# Patient Record
Sex: FEMALE | Race: WHITE | Employment: OTHER | URBAN - METROPOLITAN AREA
[De-identification: names, ages, dates, MRNs, and addresses within clinical notes are randomized per-mention and may not be internally consistent; named-entity substitution may affect disease eponyms.]

---

## 2021-10-25 ENCOUNTER — HOSPITAL ENCOUNTER (OUTPATIENT)
Dept: LAB | Age: 71
Discharge: HOME | End: 2021-10-25
Payer: MEDICARE

## 2021-10-25 DIAGNOSIS — I42.0 DILATED CARDIOMYOPATHY (HCC): ICD-10-CM

## 2021-10-25 LAB
ANION GAP SERPL CALC-SCNC: 3 MMOL/L (ref 7–16)
BASOPHILS # BLD: 0 K/UL (ref 0–0.2)
BASOPHILS NFR BLD: 0 % (ref 0–2)
BUN SERPL-MCNC: 11 MG/DL (ref 8–23)
CALCIUM SERPL-MCNC: 10 MG/DL (ref 8.3–10.4)
CHLORIDE SERPL-SCNC: 108 MMOL/L (ref 98–107)
CO2 SERPL-SCNC: 29 MMOL/L (ref 21–32)
CREAT SERPL-MCNC: 0.8 MG/DL (ref 0.6–1)
DIFFERENTIAL METHOD BLD: NORMAL
EOSINOPHIL # BLD: 0.1 K/UL (ref 0–0.8)
EOSINOPHIL NFR BLD: 2 % (ref 0.5–7.8)
ERYTHROCYTE [DISTWIDTH] IN BLOOD BY AUTOMATED COUNT: 13.7 % (ref 11.9–14.6)
GLUCOSE SERPL-MCNC: 97 MG/DL (ref 65–100)
HCT VFR BLD AUTO: 42.7 % (ref 35.8–46.3)
HGB BLD-MCNC: 13.6 G/DL (ref 11.7–15.4)
IMM GRANULOCYTES # BLD AUTO: 0 K/UL (ref 0–0.5)
IMM GRANULOCYTES NFR BLD AUTO: 0 % (ref 0–5)
INR PPP: 1
LYMPHOCYTES # BLD: 1.7 K/UL (ref 0.5–4.6)
LYMPHOCYTES NFR BLD: 35 % (ref 13–44)
MCH RBC QN AUTO: 28.3 PG (ref 26.1–32.9)
MCHC RBC AUTO-ENTMCNC: 31.9 G/DL (ref 31.4–35)
MCV RBC AUTO: 88.8 FL (ref 79.6–97.8)
MONOCYTES # BLD: 0.3 K/UL (ref 0.1–1.3)
MONOCYTES NFR BLD: 6 % (ref 4–12)
NEUTS SEG # BLD: 2.8 K/UL (ref 1.7–8.2)
NEUTS SEG NFR BLD: 57 % (ref 43–78)
NRBC # BLD: 0 K/UL (ref 0–0.2)
PLATELET # BLD AUTO: 248 K/UL (ref 150–450)
PMV BLD AUTO: 10.9 FL (ref 9.4–12.3)
POTASSIUM SERPL-SCNC: 4.6 MMOL/L (ref 3.5–5.1)
PROTHROMBIN TIME: 13 SEC (ref 12.6–14.5)
RBC # BLD AUTO: 4.81 M/UL (ref 4.05–5.2)
SODIUM SERPL-SCNC: 140 MMOL/L (ref 136–145)
WBC # BLD AUTO: 5 K/UL (ref 4.3–11.1)

## 2021-10-25 PROCEDURE — 80048 BASIC METABOLIC PNL TOTAL CA: CPT

## 2021-10-25 PROCEDURE — 36415 COLL VENOUS BLD VENIPUNCTURE: CPT

## 2021-10-25 PROCEDURE — 85025 COMPLETE CBC W/AUTO DIFF WBC: CPT

## 2021-10-25 PROCEDURE — 85610 PROTHROMBIN TIME: CPT

## 2021-10-26 ENCOUNTER — ANESTHESIA EVENT (OUTPATIENT)
Dept: CARDIAC CATH/INVASIVE PROCEDURES | Age: 71
End: 2021-10-26
Payer: MEDICARE

## 2021-10-26 RX ORDER — SODIUM CHLORIDE, SODIUM LACTATE, POTASSIUM CHLORIDE, CALCIUM CHLORIDE 600; 310; 30; 20 MG/100ML; MG/100ML; MG/100ML; MG/100ML
100 INJECTION, SOLUTION INTRAVENOUS CONTINUOUS
Status: CANCELLED | OUTPATIENT
Start: 2021-10-26 | End: 2021-10-27

## 2021-10-26 RX ORDER — MIDAZOLAM HYDROCHLORIDE 1 MG/ML
2 INJECTION, SOLUTION INTRAMUSCULAR; INTRAVENOUS
Status: CANCELLED | OUTPATIENT
Start: 2021-10-26 | End: 2021-10-27

## 2021-10-26 RX ORDER — FENTANYL CITRATE 50 UG/ML
100 INJECTION, SOLUTION INTRAMUSCULAR; INTRAVENOUS ONCE
Status: CANCELLED | OUTPATIENT
Start: 2021-10-26 | End: 2021-10-26

## 2021-10-26 RX ORDER — MIDAZOLAM HYDROCHLORIDE 1 MG/ML
2 INJECTION, SOLUTION INTRAMUSCULAR; INTRAVENOUS ONCE
Status: CANCELLED | OUTPATIENT
Start: 2021-10-26 | End: 2021-10-26

## 2021-10-26 RX ORDER — LIDOCAINE HYDROCHLORIDE 10 MG/ML
0.1 INJECTION INFILTRATION; PERINEURAL AS NEEDED
Status: CANCELLED | OUTPATIENT
Start: 2021-10-26

## 2021-10-26 NOTE — PROGRESS NOTES
Patient pre-assessment complete for [place] scheduled for gen change, arrival time 0800. Patient verified using . Patient instructed to bring all home medications in labeled bottles on the day of procedure. NPO status reinforced. atient verbalizes understanding of all instructions & denies any questions at this time.

## 2021-10-27 ENCOUNTER — HOSPITAL ENCOUNTER (OUTPATIENT)
Age: 71
Setting detail: OUTPATIENT SURGERY
Discharge: HOME | End: 2021-10-27
Attending: INTERNAL MEDICINE | Admitting: INTERNAL MEDICINE
Payer: MEDICARE

## 2021-10-27 ENCOUNTER — ANESTHESIA (OUTPATIENT)
Dept: CARDIAC CATH/INVASIVE PROCEDURES | Age: 71
End: 2021-10-27
Payer: MEDICARE

## 2021-10-27 VITALS
BODY MASS INDEX: 34.32 KG/M2 | HEART RATE: 69 BPM | DIASTOLIC BLOOD PRESSURE: 76 MMHG | WEIGHT: 206 LBS | OXYGEN SATURATION: 96 % | RESPIRATION RATE: 16 BRPM | SYSTOLIC BLOOD PRESSURE: 160 MMHG | TEMPERATURE: 98.5 F | HEIGHT: 65 IN

## 2021-10-27 DIAGNOSIS — I50.22 CHRONIC SYSTOLIC HEART FAILURE (HCC): ICD-10-CM

## 2021-10-27 DIAGNOSIS — I44.7 LBBB (LEFT BUNDLE BRANCH BLOCK): ICD-10-CM

## 2021-10-27 DIAGNOSIS — I42.0 DILATED CARDIOMYOPATHY (HCC): ICD-10-CM

## 2021-10-27 DIAGNOSIS — I10 ESSENTIAL HYPERTENSION: Primary | ICD-10-CM

## 2021-10-27 DIAGNOSIS — Z95.810 AICD (AUTOMATIC CARDIOVERTER/DEFIBRILLATOR) PRESENT: ICD-10-CM

## 2021-10-27 LAB
ATRIAL RATE: 69 BPM
CALCULATED P AXIS, ECG09: 57 DEGREES
CALCULATED R AXIS, ECG10: -73 DEGREES
CALCULATED T AXIS, ECG11: 64 DEGREES
DIAGNOSIS, 93000: NORMAL
P-R INTERVAL, ECG05: 206 MS
Q-T INTERVAL, ECG07: 474 MS
QRS DURATION, ECG06: 164 MS
QTC CALCULATION (BEZET), ECG08: 507 MS
VENTRICULAR RATE, ECG03: 69 BPM

## 2021-10-27 PROCEDURE — 33264 RMVL & RPLCMT DFB GEN MLT LD: CPT | Performed by: INTERNAL MEDICINE

## 2021-10-27 PROCEDURE — 74011250637 HC RX REV CODE- 250/637: Performed by: ANESTHESIOLOGY

## 2021-10-27 PROCEDURE — 93005 ELECTROCARDIOGRAM TRACING: CPT | Performed by: INTERNAL MEDICINE

## 2021-10-27 PROCEDURE — 77030013504 HC DEV ELECSURG MEDT -F: Performed by: INTERNAL MEDICINE

## 2021-10-27 PROCEDURE — 77030033067 HC SUT PDO STRATFX SPIR J&J -B: Performed by: INTERNAL MEDICINE

## 2021-10-27 PROCEDURE — 74011000250 HC RX REV CODE- 250: Performed by: INTERNAL MEDICINE

## 2021-10-27 PROCEDURE — 77030022704 HC SUT VLOC COVD -B: Performed by: INTERNAL MEDICINE

## 2021-10-27 PROCEDURE — 33249 INSJ/RPLCMT DEFIB W/LEAD(S): CPT | Performed by: INTERNAL MEDICINE

## 2021-10-27 PROCEDURE — C1882 AICD, OTHER THAN SING/DUAL: HCPCS | Performed by: INTERNAL MEDICINE

## 2021-10-27 PROCEDURE — 77030041279 HC DRSG PRMSL AG MDII -B: Performed by: INTERNAL MEDICINE

## 2021-10-27 PROCEDURE — 74011250636 HC RX REV CODE- 250/636: Performed by: NURSE ANESTHETIST, CERTIFIED REGISTERED

## 2021-10-27 PROCEDURE — 76060000033 HC ANESTHESIA 1 TO 1.5 HR: Performed by: INTERNAL MEDICINE

## 2021-10-27 PROCEDURE — 77030010507 HC ADH SKN DERMBND J&J -B: Performed by: INTERNAL MEDICINE

## 2021-10-27 PROCEDURE — 74011250636 HC RX REV CODE- 250/636: Performed by: INTERNAL MEDICINE

## 2021-10-27 PROCEDURE — 74011000250 HC RX REV CODE- 250: Performed by: NURSE ANESTHETIST, CERTIFIED REGISTERED

## 2021-10-27 DEVICE — IMPLANTABLE DEVICE
Type: IMPLANTABLE DEVICE | Status: FUNCTIONAL
Brand: INTICA NEO 7 HF-T DF-1 IS-1 PROMRI

## 2021-10-27 RX ORDER — SODIUM CHLORIDE, SODIUM LACTATE, POTASSIUM CHLORIDE, CALCIUM CHLORIDE 600; 310; 30; 20 MG/100ML; MG/100ML; MG/100ML; MG/100ML
75 INJECTION, SOLUTION INTRAVENOUS CONTINUOUS
Status: DISCONTINUED | OUTPATIENT
Start: 2021-10-27 | End: 2021-10-27 | Stop reason: HOSPADM

## 2021-10-27 RX ORDER — OXYCODONE HYDROCHLORIDE 5 MG/1
5 TABLET ORAL
Status: COMPLETED | OUTPATIENT
Start: 2021-10-27 | End: 2021-10-27

## 2021-10-27 RX ORDER — PROPOFOL 10 MG/ML
INJECTION, EMULSION INTRAVENOUS AS NEEDED
Status: DISCONTINUED | OUTPATIENT
Start: 2021-10-27 | End: 2021-10-27 | Stop reason: HOSPADM

## 2021-10-27 RX ORDER — OXYCODONE HYDROCHLORIDE 5 MG/1
5 TABLET ORAL
Qty: 5 TABLET | Refills: 0 | Status: SHIPPED | OUTPATIENT
Start: 2021-10-27 | End: 2021-10-30

## 2021-10-27 RX ORDER — DOXYCYCLINE 100 MG/1
100 TABLET ORAL 2 TIMES DAILY
Qty: 10 TABLET | Refills: 0 | Status: SHIPPED | OUTPATIENT
Start: 2021-10-27 | End: 2021-11-01

## 2021-10-27 RX ORDER — PROPOFOL 10 MG/ML
INJECTION, EMULSION INTRAVENOUS
Status: DISCONTINUED | OUTPATIENT
Start: 2021-10-27 | End: 2021-10-27 | Stop reason: HOSPADM

## 2021-10-27 RX ORDER — CLINDAMYCIN PHOSPHATE 900 MG/50ML
900 INJECTION INTRAVENOUS ONCE
Status: COMPLETED | OUTPATIENT
Start: 2021-10-27 | End: 2021-10-27

## 2021-10-27 RX ORDER — SODIUM CHLORIDE, SODIUM LACTATE, POTASSIUM CHLORIDE, CALCIUM CHLORIDE 600; 310; 30; 20 MG/100ML; MG/100ML; MG/100ML; MG/100ML
100 INJECTION, SOLUTION INTRAVENOUS CONTINUOUS
Status: DISCONTINUED | OUTPATIENT
Start: 2021-10-27 | End: 2021-10-27 | Stop reason: HOSPADM

## 2021-10-27 RX ORDER — HYDROMORPHONE HYDROCHLORIDE 2 MG/ML
0.5 INJECTION, SOLUTION INTRAMUSCULAR; INTRAVENOUS; SUBCUTANEOUS
Status: DISCONTINUED | OUTPATIENT
Start: 2021-10-27 | End: 2021-10-27 | Stop reason: HOSPADM

## 2021-10-27 RX ORDER — NALOXONE HYDROCHLORIDE 0.4 MG/ML
0.04 INJECTION, SOLUTION INTRAMUSCULAR; INTRAVENOUS; SUBCUTANEOUS
Status: DISCONTINUED | OUTPATIENT
Start: 2021-10-27 | End: 2021-10-27 | Stop reason: HOSPADM

## 2021-10-27 RX ORDER — LIDOCAINE HYDROCHLORIDE 20 MG/ML
INJECTION, SOLUTION EPIDURAL; INFILTRATION; INTRACAUDAL; PERINEURAL AS NEEDED
Status: DISCONTINUED | OUTPATIENT
Start: 2021-10-27 | End: 2021-10-27 | Stop reason: HOSPADM

## 2021-10-27 RX ADMIN — OXYCODONE 5 MG: 5 TABLET ORAL at 12:55

## 2021-10-27 RX ADMIN — LIDOCAINE HYDROCHLORIDE 40 MG: 20 INJECTION, SOLUTION EPIDURAL; INFILTRATION; INTRACAUDAL; PERINEURAL at 10:05

## 2021-10-27 RX ADMIN — SODIUM CHLORIDE, SODIUM LACTATE, POTASSIUM CHLORIDE, AND CALCIUM CHLORIDE: 600; 310; 30; 20 INJECTION, SOLUTION INTRAVENOUS at 09:57

## 2021-10-27 RX ADMIN — PROPOFOL 50 MG: 10 INJECTION, EMULSION INTRAVENOUS at 10:05

## 2021-10-27 RX ADMIN — CLINDAMYCIN PHOSPHATE 900 MG: 900 INJECTION, SOLUTION INTRAVENOUS at 10:06

## 2021-10-27 RX ADMIN — PROPOFOL 140 MCG/KG/MIN: 10 INJECTION, EMULSION INTRAVENOUS at 10:07

## 2021-10-27 NOTE — ANESTHESIA POSTPROCEDURE EVALUATION
Procedure(s):  INSERT ICD BIV MULTI.    total IV anesthesia    Anesthesia Post Evaluation        Patient location during evaluation: PACU  Patient participation: complete - patient participated  Level of consciousness: awake  Pain management: satisfactory to patient  Airway patency: patent  Anesthetic complications: no  Cardiovascular status: hemodynamically stable  Respiratory status: spontaneous ventilation  Hydration status: euvolemic  Post anesthesia nausea and vomiting:  none      INITIAL Post-op Vital signs:   Vitals Value Taken Time   /76 10/27/21 1216   Temp     Pulse 69 10/27/21 1219   Resp 16 10/27/21 1216   SpO2 96 % 10/27/21 1219   Vitals shown include unvalidated device data.

## 2021-10-27 NOTE — PROGRESS NOTES
Discharge instructions given. Dsg to left chest wall D&I. Complaining of mild pain to incisional site. Pain medication given.

## 2021-10-27 NOTE — PROGRESS NOTES
Patient received to [address] [place] [address] room # 11  Ambulatory from [facility]. Patient scheduled for Gen Change today with Dr Karol Olmedo. Procedure reviewed & questions answered, voiced good understanding consent obtained & placed on chart. All medications and medical history reviewed. Will prep patient per orders. Patient & family updated on plan of care. The patient has a fraility score of 3-MANAGING WELL, based on ability to ambulate independently.

## 2021-10-27 NOTE — ANESTHESIA PREPROCEDURE EVALUATION
Relevant Problems   CARDIOVASCULAR   (+) Essential hypertension   (+) LBBB (left bundle branch block)       Anesthetic History   No history of anesthetic complications            Review of Systems / Medical History  Pertinent labs reviewed    Pulmonary        Sleep apnea: CPAP           Neuro/Psych   Within defined limits           Cardiovascular    Hypertension      CHF (NICM, EF 40-45%)    Pacemaker (ICD)    Exercise tolerance: >4 METS  Comments: LBBB   GI/Hepatic/Renal  Within defined limits              Endo/Other      Hypothyroidism  Obesity     Other Findings              Physical Exam    Airway  Mallampati: II  TM Distance: 4 - 6 cm  Neck ROM: normal range of motion   Mouth opening: Diminished (comment)     Cardiovascular  Regular rate and rhythm,  S1 and S2 normal,  no murmur, click, rub, or gallop            Comments: distant Dental  No notable dental hx       Pulmonary  Breath sounds clear to auscultation              Comments: distant Abdominal  GI exam deferred       Other Findings            Anesthetic Plan    ASA: 3  Anesthesia type: total IV anesthesia          Induction: Intravenous  Anesthetic plan and risks discussed with: Patient and Son / Daughter

## 2021-10-27 NOTE — PROGRESS NOTES
Discharge instructions given. Dsg to left chest wall remains intact. C/o pain to left chest wall. Pain medication given per MD orders.

## 2021-10-27 NOTE — DISCHARGE INSTRUCTIONS
Post Op Device Instructions        Incision & Dressing Care   Please keep Aquacel dressing on wound until your 2 week follow up in device clinic. The dressing promotes healing and is meant to stay on the wound. Keep incision site completely dry for 48 hours. During this time you may take a sponge bath, but no shower. After 48 hours you may shower with your back to the water letting the water run over the dressing. Do not let the water directly hit the dressing, it is water resistant no water proof. Do not use any lotions, creams, or ointments on the incision. Avoid manipulating the device or leads with your fingers. Do not massage or excessively rub the implant site. This could displace the leads      For four weeks after your implant   Do not lift anything heavier than a gallon of milk. Do not raise your elbow above the level of your shoulder on the Left shoulder implant side. Avoid excessive pushing, pulling, or twisting. Call you doctor for any of the following:   Any signs of infection, including redness, swelling or pain at the incision site, or a   temperature of 101° F or greater. If you have twitching chest muscles, hiccups that won't stop, or a swollen arm on the   side of the incision. Any feelings of light-headedness, chest pain, or shortness of breath. Please notify your doctors and dentists that you have an ICD. You should not drive until 1 week after your implant or after your first follow-up appointment, whichever comes first. At that time, you can discuss when you may return to your regular driving routine. About 1 month after implant, you will receive a card by mail from the company who manufactured your ICD. Your device was manufactured by [employer]. You should carry this card with you at all times. If you receive one shock from your device:   and you feel ok, you may call to let your physician know.    but if you are feeling poorly, please notify your doctor. You may need to be seen. If you receive more than one shock in a 24 hour period, call your physician to schedule a visit or report to the emergency room. Please call the [facility] [address] at  [phone] if you have questions or concerns about your device. Please call the hospital if it is after 5 pm or the weekend please page the on call cardiologist at [facility] at [address] will need to have your device checked 1- 2 weeks after the procedure and should have an appointment with the device clinic. If you do not hear from them call the device clinic. Sedation for a Medical Procedure: Care Instructions     You were given a sedative medication during your visit. While many of the effects will have worn   off before you leave; you may continue to feel some effects for several hours. Common side effects from sedation include:  · Feeling sleepy. (Your doctors and nurses will make sure you are not too sleepy to go home.)  · Nausea and vomiting. This usually does not last long. · Feeling tired. How can you care for yourself at home? Activity    · Don't do anything for 24 hours that requires attention to detail. It takes time for the medicine effects to completely wear off. · Do not make important legal decisions for 24 hours. · Do not sign any legal documents for 24 hours. · Do not drink alcohol today     · For your safety, you should not drive or operate heavy machinery for the remainder of the day     · Rest when you feel tired. Getting enough sleep will help you recover. Diet    · You can eat your normal diet, unless your doctor gives you other instructions. If your stomach is upset, try clear liquids and bland, low-fat foods like plain toast or rice. · Drink plenty of fluids (unless your doctor tells you not to). · Don't drink alcohol for 24 hours. Medicines    · Be safe with medicines.  Read and follow all instructions on the label. · If the doctor gave you a prescription medicine for pain, take it as prescribed. · If you are not taking a prescription pain medicine, ask your doctor if you can take an over-the-counter medicine. · If you think your pain medicine is making you sick to your stomach:  · Take your medicine after meals (unless your doctor has told you not to). · Ask your doctor for a different pain medicine. I have read the above instructions and have had the opportunity to ask questions. Patient: ________________________   Date: _____________    Witness: _______________________   Date: _____________        Post Op Device Instructions        Please keep Aquacel dressing on wound until your 1-2 week follow up in device clinic. The dressing promotes healing and is meant to stay on the wound. Keep incision site completely dry for one week. During this week you may take a sponge bath, but no shower. After one week you may shower, cleaning the wound with soap and nava.  Do not use any lotions, creams, or ointments on the incision.         For four weeks after your implant    Do not lift anything heavier than a gallon of milk.    Do not raise your elbow above the level of your shoulder on the ICD implant side.    Avoid excessive pushing, pulling, or twisting.    Call you doctor for any of the following:    Any signs of infection, including redness, swelling or pain at the incision site, or a temperature of 101° F or greater.    If you have twitching chest muscles, hiccups that won't stop, or a swollen arm on the side of the incision.    Any feelings of light-headedness, chest pain, or shortness of breath.    Please notify your doctors and dentists that you have a defibrillator.         You should not drive until 1 week after your implant or after your first follow-up appointment, whichever comes first. At that time, you can discuss when you may return to your regular driving routine.       About 1 month after implant, you will receive a card by mail from the company who manufactured your ICD. Your device was manufactured by [facility] [employer]. You should carry this card with you at all times.         Avoid manipulating the device or leads with your fingers. Do not massage or excessively rub the implant site.         If you receive one shock from your device:    and you feel ok, you may call to let your physician know.    but feel poorly, please notify your doctor. You may need to be seen.    If you receive more than one shock in a 24 hour period, call your physician to schedule a visit or report to the emergency room.   Link Jose E call the device clinic or Kayleigh Dupree RN, (EP Nurse) [phone] if you have questions or concerns about your device. Please call the hospital if it is after 5 pm or the weekend please page the on call cardiologist at [facility] at [phone]   Janeth Herrera will need to have your device checked 1- 2 weeks after the procedure and should have an appointment with the device clinic.

## 2021-10-27 NOTE — PROCEDURES
: Delonet Kamara. Elvin Gaitan MD    REFERRING: Marilee Bruner MD    Procedure: BiV ICD Generator Removal/Replacement without DFTs    Pre-Procedure Diagnosis  1. Chronic systolic heart failure, ejection fraction of 20%  2. Nonischemic dilated cardiomyopathy. 3. Class II heart failure symptoms. 4. Chronic systolic heart failure for a minimum of 3 months duration on optimal medical therapy. 5. Primary prevention indications for defibrillator  6. Life expectancy greater than 1 year. 7. LBBB with QRS duration of >150 msec. Procedure Performed  1. Insertion of Biotronik biventricular implantable cardioverter defibrillator. Anesthesia: MAC     Estimated Blood Loss: Less than 50 mL     Specimens: * No specimens in log *     Complications: None    Fluoroscopy Time: 0 minutes     No contrast    Patient Information and Indications: The procedure, indications, risks, benefits, and alternatives were discussed with the patient and family members, who desired to proceed after questions were answered and informed consent was documented. After informed consent was obtained, the patient was brought to the Electrophysiology Laboratory in a post-absorptive and was prepped and draped in sterile fashion. Prophylactic antibiotic was administered prior to skin incision. Conscious sedation was administered with continuous oxygen saturation measurement and blood pressure measurement by Anesthesia. Local anesthetic (1% lidocaine with epinephrine) was delivered to the left pectoral region and an incision was made parallel to the deltopectoral groove directly over the prior surgical scar. The subcutaneous pocket was opened using blunt dissection and Bovie electrocautery using the [employer] ([employer]), and adequate hemostasis was established. The device was freed from overlying fibrotic tissue and the leads freed to give enough slack for device exchange.   The leads were individually removed from the chronic generator and tested using the PSA revealing excellent pacing/sensing parameters. The lead pins were then cleaned with antibiotic soaked gauze, dried gently, and attached to a new biventricular ICD generator. Pins were directly observed to pass the tip electrode, and the ring hex wrench screws were secured, and leads tug tested. The device and leads were gently positioned within the pocket. The pocket was irrigated copiously with a saline antimicrobial solution. The device was and leads were tested a second time prior to pocket closure. The wound was closed with multiple layers of absorbable suture (3-0 Stratafix) followed by skin closure with 4-0 V-loc. The patient tolerated the procedure well and there was no complications. The patient was allowed to awake from anesthesia and was transferred to the recovery area in stable condition. All sharps and sponges were accounted for x 2.       Device and Lead Information  Pulse Generator Model #  Serial # Location Implant/Explant   [id number] Biotronik [id number] Left Pectoral Implant     Lead Model Number  Serial Number Lead position Implant/Explant   RA Selox JT 45 Biotronik [id number] RA Appendage Implant  02/11/2013   RV Linox SD 60/16 Biotronik [id number] RV Lawrence Implant  02/11/2013   LV Corox L 75 Biotronik [id number] LV Lateral Implant  02/11/2013     Lead Sensitivity and Threshold  Lead R or P sensitivity (mv) Threshold (V) Threshold PW (msec) Impedance (ohms) Final output Voltage (V) Final PW (msec)   RA 1.6 0.7 0.4 638 1.5 0.4   RV 7.6 1.7 0.4 912 2.5 0.4   LV 20.3 1.4 1.0 404 1.75 1.0     Bradycardia Settings  Donnie Mode LRL URL Pace AVD (ms) Sense AVD (ms) Rate Response Mode Switching Mode SW Rate   DDD 45 130 150 150 Off On 160     Tachycardia Settings  Zone Type VT1 VT2 VF   ON/OFF/  MONITOR MONITOR ON ON   Zone Rate 150 182 207   1st Therapy Type -- ATP-burst x3 ATP-burst x1   Energy (J) -- 80% 80%   2nd Therapy Type -- ATP-ramp x3 Shock    Energy (J) -- 80% 30 3rd Therapy Type -- Shock Shock   Energy (J) -- 30 30   4th Therapy Type -- Shock Shock   Energy (J) -- 40 40   5th Therapy Type -- Shock Shock   Energy (J) -- 40 40   6th Therapy Type -- Shock Shock   Energy (J) -- 40*5 40*4     No Defibrillation Threshold Testing    LV Pacing Configuration: 1 LVtip? RV Coil    Shock Impedance: 49 ohms       IMPRESSION: Successful BiV ICD generator replacement without DFTs. PLAN:  1) Routine CIED instructions. 2) Device clinic follow up in 1-2 weeks. 3) Routine cardiology follow up with Dr. Estelle Dow. Elisa Garrido.  Mariia Stanford MD, [place]  Clinical Cardiac Electrophysiology  [facility] Cardiology  10/27/2021  10:45 AM

## 2022-07-29 RX ORDER — LOSARTAN POTASSIUM 25 MG/1
25 TABLET ORAL DAILY
Qty: 90 TABLET | Refills: 3 | Status: SHIPPED | OUTPATIENT
Start: 2022-07-29

## 2022-07-29 NOTE — TELEPHONE ENCOUNTER
MEDICATION REFILL REQUEST      Name of Medication:  Losartan  Dose:  25mg  Frequency:  daily   Quantity:    Days' supply:  80      Pharmacy Name/Location:  [facility]     Please call in today because she only has 4 days left. Mom aware breaks during mri with anesthesia would be impossible. She verbalized understanding

## 2022-08-09 PROCEDURE — 93295 DEV INTERROG REMOTE 1/2/MLT: CPT | Performed by: INTERNAL MEDICINE

## 2022-08-09 PROCEDURE — 93296 REM INTERROG EVL PM/IDS: CPT | Performed by: INTERNAL MEDICINE

## 2022-10-03 DIAGNOSIS — Z95.810 AICD (AUTOMATIC CARDIOVERTER/DEFIBRILLATOR) PRESENT: ICD-10-CM

## 2022-10-03 DIAGNOSIS — I50.22 CHRONIC SYSTOLIC HEART FAILURE (HCC): ICD-10-CM

## 2022-10-03 DIAGNOSIS — I42.9 CARDIOMYOPATHY (HCC): ICD-10-CM

## 2022-10-03 DIAGNOSIS — I42.9 CARDIOMYOPATHY (HCC): Primary | ICD-10-CM

## 2022-11-22 DIAGNOSIS — I42.9 CARDIOMYOPATHY (HCC): ICD-10-CM

## 2022-11-22 DIAGNOSIS — Z95.810 AICD (AUTOMATIC CARDIOVERTER/DEFIBRILLATOR) PRESENT: ICD-10-CM

## 2022-11-22 DIAGNOSIS — I50.22 CHRONIC SYSTOLIC HEART FAILURE (HCC): ICD-10-CM

## 2022-11-28 ENCOUNTER — OFFICE VISIT (OUTPATIENT)
Dept: CARDIOLOGY CLINIC | Age: 72
End: 2022-11-28
Payer: MEDICARE

## 2022-11-28 VITALS
DIASTOLIC BLOOD PRESSURE: 70 MMHG | SYSTOLIC BLOOD PRESSURE: 120 MMHG | HEART RATE: 68 BPM | WEIGHT: 201 LBS | BODY MASS INDEX: 33.49 KG/M2 | HEIGHT: 65 IN

## 2022-11-28 DIAGNOSIS — Z95.810 AICD (AUTOMATIC CARDIOVERTER/DEFIBRILLATOR) PRESENT: ICD-10-CM

## 2022-11-28 DIAGNOSIS — I42.9 CARDIOMYOPATHY, UNSPECIFIED TYPE (HCC): Primary | ICD-10-CM

## 2022-11-28 DIAGNOSIS — I10 ESSENTIAL HYPERTENSION: ICD-10-CM

## 2022-11-28 PROCEDURE — 99213 OFFICE O/P EST LOW 20 MIN: CPT | Performed by: INTERNAL MEDICINE

## 2022-11-28 PROCEDURE — G8427 DOCREV CUR MEDS BY ELIG CLIN: HCPCS | Performed by: INTERNAL MEDICINE

## 2022-11-28 PROCEDURE — 3078F DIAST BP <80 MM HG: CPT | Performed by: INTERNAL MEDICINE

## 2022-11-28 PROCEDURE — 1090F PRES/ABSN URINE INCON ASSESS: CPT | Performed by: INTERNAL MEDICINE

## 2022-11-28 PROCEDURE — 3074F SYST BP LT 130 MM HG: CPT | Performed by: INTERNAL MEDICINE

## 2022-11-28 PROCEDURE — 1036F TOBACCO NON-USER: CPT | Performed by: INTERNAL MEDICINE

## 2022-11-28 PROCEDURE — G8419 CALC BMI OUT NRM PARAM NOF/U: HCPCS | Performed by: INTERNAL MEDICINE

## 2022-11-28 PROCEDURE — 1123F ACP DISCUSS/DSCN MKR DOCD: CPT | Performed by: INTERNAL MEDICINE

## 2022-11-28 PROCEDURE — G8484 FLU IMMUNIZE NO ADMIN: HCPCS | Performed by: INTERNAL MEDICINE

## 2022-11-28 PROCEDURE — 3017F COLORECTAL CA SCREEN DOC REV: CPT | Performed by: INTERNAL MEDICINE

## 2022-11-28 PROCEDURE — G8400 PT W/DXA NO RESULTS DOC: HCPCS | Performed by: INTERNAL MEDICINE

## 2022-11-28 ASSESSMENT — ENCOUNTER SYMPTOMS
COLOR CHANGE: 0
DIARRHEA: 0
ORTHOPNEA: 0
BLURRED VISION: 0
BOWEL INCONTINENCE: 0
SHORTNESS OF BREATH: 0
HEMATEMESIS: 0
SPUTUM PRODUCTION: 0
WHEEZING: 0
HOARSE VOICE: 0
HEMATOCHEZIA: 0
ABDOMINAL PAIN: 0
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
## 2022-12-19 DIAGNOSIS — I50.22 CHRONIC SYSTOLIC HEART FAILURE (HCC): ICD-10-CM

## 2022-12-19 DIAGNOSIS — Z95.810 AICD (AUTOMATIC CARDIOVERTER/DEFIBRILLATOR) PRESENT: ICD-10-CM

## 2022-12-19 DIAGNOSIS — I42.9 CARDIOMYOPATHY (HCC): Primary | ICD-10-CM

## 2023-02-06 PROCEDURE — 93296 REM INTERROG EVL PM/IDS: CPT | Performed by: INTERNAL MEDICINE

## 2023-02-06 PROCEDURE — 93295 DEV INTERROG REMOTE 1/2/MLT: CPT | Performed by: INTERNAL MEDICINE

## 2023-03-24 DIAGNOSIS — I50.22 CHRONIC SYSTOLIC HEART FAILURE (HCC): ICD-10-CM

## 2023-03-24 DIAGNOSIS — I42.9 CARDIOMYOPATHY (HCC): ICD-10-CM

## 2023-03-24 DIAGNOSIS — Z95.810 AICD (AUTOMATIC CARDIOVERTER/DEFIBRILLATOR) PRESENT: ICD-10-CM

## 2023-03-31 NOTE — TELEPHONE ENCOUNTER
MEDICATION REFILL REQUEST      Name of Medication:  Carvedilol   Dose:  6.25 mg  Frequency:  twice a day   Quantity:    Days' supply:  90      Pharmacy Name/Location:  did not leave

## 2023-04-02 RX ORDER — CARVEDILOL 6.25 MG/1
6.25 TABLET ORAL 2 TIMES DAILY WITH MEALS
Qty: 180 TABLET | Refills: 3 | Status: SHIPPED | OUTPATIENT
Start: 2023-04-02

## 2023-05-18 ENCOUNTER — OFFICE VISIT (OUTPATIENT)
Age: 73
End: 2023-05-18
Payer: MEDICARE

## 2023-05-18 VITALS
BODY MASS INDEX: 33.28 KG/M2 | DIASTOLIC BLOOD PRESSURE: 80 MMHG | WEIGHT: 200 LBS | SYSTOLIC BLOOD PRESSURE: 138 MMHG | HEART RATE: 68 BPM

## 2023-05-18 DIAGNOSIS — I42.9 CARDIOMYOPATHY, UNSPECIFIED TYPE (HCC): Primary | ICD-10-CM

## 2023-05-18 DIAGNOSIS — I10 ESSENTIAL HYPERTENSION: ICD-10-CM

## 2023-05-18 DIAGNOSIS — Z95.810 AICD (AUTOMATIC CARDIOVERTER/DEFIBRILLATOR) PRESENT: ICD-10-CM

## 2023-05-18 PROCEDURE — 3075F SYST BP GE 130 - 139MM HG: CPT | Performed by: INTERNAL MEDICINE

## 2023-05-18 PROCEDURE — 3017F COLORECTAL CA SCREEN DOC REV: CPT | Performed by: INTERNAL MEDICINE

## 2023-05-18 PROCEDURE — 99214 OFFICE O/P EST MOD 30 MIN: CPT | Performed by: INTERNAL MEDICINE

## 2023-05-18 PROCEDURE — G8417 CALC BMI ABV UP PARAM F/U: HCPCS | Performed by: INTERNAL MEDICINE

## 2023-05-18 PROCEDURE — 1090F PRES/ABSN URINE INCON ASSESS: CPT | Performed by: INTERNAL MEDICINE

## 2023-05-18 PROCEDURE — G8400 PT W/DXA NO RESULTS DOC: HCPCS | Performed by: INTERNAL MEDICINE

## 2023-05-18 PROCEDURE — 3079F DIAST BP 80-89 MM HG: CPT | Performed by: INTERNAL MEDICINE

## 2023-05-18 PROCEDURE — G8427 DOCREV CUR MEDS BY ELIG CLIN: HCPCS | Performed by: INTERNAL MEDICINE

## 2023-05-18 PROCEDURE — 1123F ACP DISCUSS/DSCN MKR DOCD: CPT | Performed by: INTERNAL MEDICINE

## 2023-05-18 PROCEDURE — 1036F TOBACCO NON-USER: CPT | Performed by: INTERNAL MEDICINE

## 2023-05-18 ASSESSMENT — ENCOUNTER SYMPTOMS
SHORTNESS OF BREATH: 0
HEMATEMESIS: 0
BOWEL INCONTINENCE: 0
ORTHOPNEA: 0
BLURRED VISION: 0
WHEEZING: 0
ABDOMINAL PAIN: 0
HEMATOCHEZIA: 0
HOARSE VOICE: 0
DIARRHEA: 0
SPUTUM PRODUCTION: 0
COLOR CHANGE: 0

## 2023-06-08 ENCOUNTER — ANESTHESIA (OUTPATIENT)
Dept: CARDIAC CATH/INVASIVE PROCEDURES | Age: 73
End: 2023-06-08
Payer: MEDICARE

## 2023-06-08 ENCOUNTER — TELEPHONE (OUTPATIENT)
Age: 73
End: 2023-06-08

## 2023-06-08 ENCOUNTER — ANESTHESIA EVENT (OUTPATIENT)
Dept: CARDIAC CATH/INVASIVE PROCEDURES | Age: 73
End: 2023-06-08
Payer: MEDICARE

## 2023-06-08 ENCOUNTER — APPOINTMENT (OUTPATIENT)
Dept: GENERAL RADIOLOGY | Age: 73
End: 2023-06-08
Payer: MEDICARE

## 2023-06-08 ENCOUNTER — HOSPITAL ENCOUNTER (EMERGENCY)
Age: 73
Discharge: HOME | End: 2023-06-08
Attending: EMERGENCY MEDICINE
Payer: MEDICARE

## 2023-06-08 VITALS
SYSTOLIC BLOOD PRESSURE: 128 MMHG | RESPIRATION RATE: 16 BRPM | HEIGHT: 65 IN | OXYGEN SATURATION: 97 % | DIASTOLIC BLOOD PRESSURE: 117 MMHG | BODY MASS INDEX: 32.99 KG/M2 | WEIGHT: 198 LBS | HEART RATE: 77 BPM | TEMPERATURE: 98 F

## 2023-06-08 DIAGNOSIS — T82.110A FAILURE OF IMPLANTABLE CARDIOVERTER-DEFIBRILLATOR (ICD) LEAD, INITIAL ENCOUNTER: ICD-10-CM

## 2023-06-08 DIAGNOSIS — T82.111A MALFUNCTION OF CARDIAC PACEMAKER, INITIAL ENCOUNTER: Primary | ICD-10-CM

## 2023-06-08 DIAGNOSIS — I42.9 CARDIOMYOPATHY (HCC): Primary | ICD-10-CM

## 2023-06-08 DIAGNOSIS — T82.110A FAILURE OF IMPLANTABLE CARDIOVERTER-DEFIBRILLATOR (ICD) LEAD: ICD-10-CM

## 2023-06-08 LAB
ALBUMIN SERPL-MCNC: 3.4 G/DL (ref 3.2–4.6)
ALBUMIN/GLOB SERPL: 1.1 (ref 0.4–1.6)
ALP SERPL-CCNC: 87 U/L (ref 50–136)
ALT SERPL-CCNC: 29 U/L (ref 12–65)
ANION GAP SERPL CALC-SCNC: 4 MMOL/L (ref 2–11)
AST SERPL-CCNC: 14 U/L (ref 15–37)
BASOPHILS # BLD: 0 K/UL (ref 0–0.2)
BASOPHILS NFR BLD: 0 % (ref 0–2)
BILIRUB SERPL-MCNC: 0.6 MG/DL (ref 0.2–1.1)
BUN SERPL-MCNC: 14 MG/DL (ref 8–23)
CALCIUM SERPL-MCNC: 8.8 MG/DL (ref 8.3–10.4)
CHLORIDE SERPL-SCNC: 108 MMOL/L (ref 101–110)
CO2 SERPL-SCNC: 28 MMOL/L (ref 21–32)
CREAT SERPL-MCNC: 0.8 MG/DL (ref 0.6–1)
DIFFERENTIAL METHOD BLD: NORMAL
ECHO BSA: 2.03 M2
EOSINOPHIL # BLD: 0.1 K/UL (ref 0–0.8)
EOSINOPHIL NFR BLD: 2 % (ref 0.5–7.8)
ERYTHROCYTE [DISTWIDTH] IN BLOOD BY AUTOMATED COUNT: 13.1 % (ref 11.9–14.6)
GLOBULIN SER CALC-MCNC: 3.2 G/DL (ref 2.8–4.5)
GLUCOSE SERPL-MCNC: 111 MG/DL (ref 65–100)
HCT VFR BLD AUTO: 39.8 % (ref 35.8–46.3)
HGB BLD-MCNC: 13.4 G/DL (ref 11.7–15.4)
IMM GRANULOCYTES # BLD AUTO: 0 K/UL (ref 0–0.5)
IMM GRANULOCYTES NFR BLD AUTO: 0 % (ref 0–5)
LYMPHOCYTES # BLD: 1.7 K/UL (ref 0.5–4.6)
LYMPHOCYTES NFR BLD: 36 % (ref 13–44)
MAGNESIUM SERPL-MCNC: 2 MG/DL (ref 1.8–2.4)
MCH RBC QN AUTO: 29.7 PG (ref 26.1–32.9)
MCHC RBC AUTO-ENTMCNC: 33.7 G/DL (ref 31.4–35)
MCV RBC AUTO: 88.2 FL (ref 82–102)
MONOCYTES # BLD: 0.3 K/UL (ref 0.1–1.3)
MONOCYTES NFR BLD: 7 % (ref 4–12)
NEUTS SEG # BLD: 2.6 K/UL (ref 1.7–8.2)
NEUTS SEG NFR BLD: 55 % (ref 43–78)
NRBC # BLD: 0 K/UL (ref 0–0.2)
PLATELET # BLD AUTO: 194 K/UL (ref 150–450)
PMV BLD AUTO: 10.9 FL (ref 9.4–12.3)
POTASSIUM SERPL-SCNC: 4 MMOL/L (ref 3.5–5.1)
PROT SERPL-MCNC: 6.6 G/DL (ref 6.3–8.2)
RBC # BLD AUTO: 4.51 M/UL (ref 4.05–5.2)
SODIUM SERPL-SCNC: 140 MMOL/L (ref 133–143)
WBC # BLD AUTO: 4.6 K/UL (ref 4.3–11.1)

## 2023-06-08 PROCEDURE — 6360000002 HC RX W HCPCS

## 2023-06-08 PROCEDURE — 75820 VEIN X-RAY ARM/LEG: CPT | Performed by: INTERNAL MEDICINE

## 2023-06-08 PROCEDURE — 71045 X-RAY EXAM CHEST 1 VIEW: CPT

## 2023-06-08 PROCEDURE — 3700000000 HC ANESTHESIA ATTENDED CARE: Performed by: INTERNAL MEDICINE

## 2023-06-08 PROCEDURE — 99284 EMERGENCY DEPT VISIT MOD MDM: CPT

## 2023-06-08 PROCEDURE — 85025 COMPLETE CBC W/AUTO DIFF WBC: CPT

## 2023-06-08 PROCEDURE — 2500000003 HC RX 250 WO HCPCS

## 2023-06-08 PROCEDURE — 2580000003 HC RX 258

## 2023-06-08 PROCEDURE — 3700000001 HC ADD 15 MINUTES (ANESTHESIA): Performed by: INTERNAL MEDICINE

## 2023-06-08 PROCEDURE — 80053 COMPREHEN METABOLIC PANEL: CPT

## 2023-06-08 PROCEDURE — 83735 ASSAY OF MAGNESIUM: CPT

## 2023-06-08 RX ORDER — CEFAZOLIN SODIUM 1 G/3ML
INJECTION, POWDER, FOR SOLUTION INTRAMUSCULAR; INTRAVENOUS PRN
Status: DISCONTINUED | OUTPATIENT
Start: 2023-06-08 | End: 2023-06-08 | Stop reason: SDUPTHER

## 2023-06-08 RX ORDER — LIDOCAINE HYDROCHLORIDE 20 MG/ML
INJECTION, SOLUTION EPIDURAL; INFILTRATION; INTRACAUDAL; PERINEURAL PRN
Status: DISCONTINUED | OUTPATIENT
Start: 2023-06-08 | End: 2023-06-08 | Stop reason: SDUPTHER

## 2023-06-08 RX ORDER — SODIUM CHLORIDE, SODIUM LACTATE, POTASSIUM CHLORIDE, CALCIUM CHLORIDE 600; 310; 30; 20 MG/100ML; MG/100ML; MG/100ML; MG/100ML
INJECTION, SOLUTION INTRAVENOUS CONTINUOUS PRN
Status: DISCONTINUED | OUTPATIENT
Start: 2023-06-08 | End: 2023-06-08 | Stop reason: SDUPTHER

## 2023-06-08 RX ORDER — PROPOFOL 10 MG/ML
INJECTION, EMULSION INTRAVENOUS PRN
Status: DISCONTINUED | OUTPATIENT
Start: 2023-06-08 | End: 2023-06-08 | Stop reason: SDUPTHER

## 2023-06-08 RX ORDER — DEXMEDETOMIDINE HYDROCHLORIDE 100 UG/ML
INJECTION, SOLUTION INTRAVENOUS PRN
Status: DISCONTINUED | OUTPATIENT
Start: 2023-06-08 | End: 2023-06-08 | Stop reason: SDUPTHER

## 2023-06-08 RX ADMIN — PROPOFOL 100 MCG/KG/MIN: 10 INJECTION, EMULSION INTRAVENOUS at 10:55

## 2023-06-08 RX ADMIN — DEXMEDETOMIDINE 8 MCG: 100 INJECTION, SOLUTION, CONCENTRATE INTRAVENOUS at 10:54

## 2023-06-08 RX ADMIN — LIDOCAINE HYDROCHLORIDE 40 MG: 20 INJECTION, SOLUTION EPIDURAL; INFILTRATION; INTRACAUDAL; PERINEURAL at 10:54

## 2023-06-08 RX ADMIN — CEFAZOLIN SODIUM 2 G: 1 INJECTION, POWDER, FOR SOLUTION INTRAMUSCULAR; INTRAVENOUS at 10:56

## 2023-06-08 RX ADMIN — PROPOFOL 30 MG: 10 INJECTION, EMULSION INTRAVENOUS at 10:54

## 2023-06-08 RX ADMIN — SODIUM CHLORIDE, SODIUM LACTATE, POTASSIUM CHLORIDE, AND CALCIUM CHLORIDE: 600; 310; 30; 20 INJECTION, SOLUTION INTRAVENOUS at 10:46

## 2023-06-08 RX ADMIN — DEXMEDETOMIDINE 8 MCG: 100 INJECTION, SOLUTION, CONCENTRATE INTRAVENOUS at 10:58

## 2023-06-08 ASSESSMENT — PAIN SCALES - GENERAL: PAINLEVEL_OUTOF10: 0

## 2023-06-08 ASSESSMENT — LIFESTYLE VARIABLES: SMOKING_STATUS: 1

## 2023-06-08 ASSESSMENT — PAIN - FUNCTIONAL ASSESSMENT: PAIN_FUNCTIONAL_ASSESSMENT: 0-10

## 2023-06-08 NOTE — PROCEDURES
extraction and re-implantation.   -Routine cardiac care per Dr. Jessi Vega. Abdullahi Gastelum.  Ni Cleary MD, [address]  Clinical Cardiac Electrophysiology  [facility] Cardiology    6/8/2023  11:52 AM

## 2023-06-08 NOTE — TELEPHONE ENCOUNTER
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 Per Dr. Franky Lin patient sent to [address] for extraction with Dr. Tunde Key referral sent.

## 2023-06-08 NOTE — ANESTHESIA POSTPROCEDURE EVALUATION
Department of Anesthesiology  Postprocedure Note    Patient: Ayla Cuevas  MRN: [id number]  YOB: 1950  Date of evaluation: 6/8/2023      Procedure Summary     Date: 06/08/23 Room / Location: [place] [address] ALL EVENTS / D CARDIAC CATH LAB    Anesthesia Start: 1046 Anesthesia Stop: 9518    Procedure: Venogram Diagnosis:       Failure of implantable cardioverter-defibrillator (ICD) lead, initial encounter      (Failure of implantable cardioverter-defibrillator (ICD) lead [T82.110A])    Providers: Gaby Lay MD Responsible Provider: Beatrice Greene MD    Anesthesia Type: TIVA ASA Status: 3          Anesthesia Type: No value filed.     Chandana Phase I: Chandana Score: 10    Chandana Phase II:        Anesthesia Post Evaluation    Patient location during evaluation: PACU  Patient participation: complete - patient participated  Level of consciousness: awake  Airway patency: patent  Nausea & Vomiting: no nausea  Complications: no  Cardiovascular status: blood pressure returned to baseline and hemodynamically stable  Respiratory status: acceptable  Hydration status: stable  Multimodal analgesia pain management approach
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
## 2023-06-08 NOTE — PROGRESS NOTES
Report received from Terence Lefort RN. Procedural finding communicated. Intra procedural medication administration reviewed. Progression of care discussed. Patient received into CPRU room 1, Post venogram.    Routine post procedural vital signs & site assessment initiated.

## 2023-06-08 NOTE — DISCHARGE INSTRUCTIONS
Sedation for a Medical Procedure: Care Instructions     You were given a sedative medication during your visit. While many of the effects will have worn   off before you leave; you may continue to feel some effects for several hours. Common side effects from sedation include:  Feeling sleepy. (Your doctors and nurses will make sure you are not too sleepy to go home.)  Nausea and vomiting. This usually does not last long. Feeling tired. How can you care for yourself at home? Activity    Don't do anything for 24 hours that requires attention to detail. It takes time for the medicine effects to completely wear off. Do not make important legal decisions for 24 hours. Do not sign any legal documents for 24 hours. Do not drink alcohol today     For your safety, you should not drive or operate heavy machinery for the remainder of the day     Rest when you feel tired. Getting enough sleep will help you recover. A  Diet    You can eat your normal diet, unless your doctor gives you other instructions. If your stomach is upset, try clear liquids and bland, low-fat foods like plain toast or rice. Drink plenty of fluids (unless your doctor tells you not to). Don't drink alcohol for 24 hours. Medicines    Be safe with medicines. Read and follow all instructions on the label. If the doctor gave you a prescription medicine for pain, take it as prescribed. If you are not taking a prescription pain medicine, ask your doctor if you can take an over-the-counter medicine. If you think your pain medicine is making you sick to your stomach: Take your medicine after meals (unless your doctor has told you not to). Ask your doctor for a different pain medicine. I have read the above instructions and have had the opportunity to ask questions.       Patient: ________________________   Date: _____________    Witness: _______________________   Date: _____________

## 2023-06-08 NOTE — PROGRESS NOTES
Discharge instructions & follow up care reviewed with patient including post venogram care and medications side effects. Time allowed for questions and answers. Patient/patient family verbalized understanding. INT removed with cath intact. Patient wheeled out to private vehicle via RN.

## 2023-06-08 NOTE — H&P
supple/nontender, pulses 2+ bilaterally  Skin: warm and dry  Psychiatric: Normal mood and affect  Neurologic: Alert and oriented X 3    Labs:     Recent Labs     06/08/23  0744   WBC 4.6   HGB 13.4   HCT 39.8   MCV 88.2        Lab Results   Component Value Date    WBC 4.6 06/08/2023    HGB 13.4 06/08/2023    HCT 39.8 06/08/2023     06/08/2023    ALT 29 06/08/2023    AST 14 (L) 06/08/2023     06/08/2023    K 4.0 06/08/2023     06/08/2023    CREATININE 0.80 06/08/2023    BUN 14 06/08/2023    CO2 28 06/08/2023    INR 1.0 10/25/2021      Pt has been seen and examined by Dr. Justen Liu. He agrees with the following assessment and plan. Assessment/Plan:       RV lead fracture  - lead revision today  - Keep NPO    NICM/HFrEF  - on carvedilol, losartan    HTN  - stable   - monitor and titrate medications as needed       Thank you for requesting cardiac evaluation and allowing us to participate in the care of this patient. We will continue to follow along with you.       Courtney Millard PA-C  Supervising Physician: Dr Justen Liu
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
## 2023-06-08 NOTE — ED PROVIDER NOTES
concluded at that time. Her chronic device leads appeared in   good position with no finding of  fracture or externalization   of conduction wires/components. The patient tolerated the procedure well   and was transferred to the recovery area in stable condition. Impression     Left subclavian stenosis. PLAN: Referral to Dr. Zita Emmanuel for ICD extraction and re-implantation.   -Routine cardiac care per Dr. Kaiden Amador. Dayami Arita MD, MS  Clinical Cardiac Electrophysiology  [facility]     6/8/2023  11:52 AM          XR CHEST PORTABLE   Final Result   The lungs are clear. The heart is normal in size. No pneumothorax. No pleural effusions. Implantable cardiac device left upper chest and 3 leads   appear unchanged in position. Voice dictation software was used during the making of this note. This software is not perfect and grammatical and other typographical errors may be present. This note has not been completely proofread for errors.      Josey Robins MD  06/08/23 9409

## 2023-07-11 ENCOUNTER — TELEPHONE (OUTPATIENT)
Age: 73
End: 2023-07-11

## 2023-07-11 NOTE — TELEPHONE ENCOUNTER
Patient had surgery on 06/30/2023 at [address]. Her doctor when he released her said for her to see Dr. Janay Romo for hospital follow up. Please call patient asap to make her an appointment to follow up with Dr. Janay Romo.

## 2023-07-11 NOTE — TELEPHONE ENCOUNTER
Called patient bart et her know we are working on finding a f/u appointment with Dr. Micheline Valerio. Patient was advised we will call her when an appointment date/time is determined. Patient AGGIE.

## 2023-07-17 ENCOUNTER — NURSE ONLY (OUTPATIENT)
Age: 73
End: 2023-07-17

## 2023-07-17 ENCOUNTER — OFFICE VISIT (OUTPATIENT)
Age: 73
End: 2023-07-17
Payer: MEDICARE

## 2023-07-17 VITALS
HEART RATE: 67 BPM | WEIGHT: 186.1 LBS | HEIGHT: 66 IN | SYSTOLIC BLOOD PRESSURE: 160 MMHG | DIASTOLIC BLOOD PRESSURE: 82 MMHG | BODY MASS INDEX: 29.91 KG/M2

## 2023-07-17 DIAGNOSIS — I50.22 CHRONIC SYSTOLIC (CONGESTIVE) HEART FAILURE (HCC): Primary | ICD-10-CM

## 2023-07-17 DIAGNOSIS — I44.7 LBBB (LEFT BUNDLE BRANCH BLOCK): Primary | ICD-10-CM

## 2023-07-17 DIAGNOSIS — I10 ESSENTIAL HYPERTENSION: ICD-10-CM

## 2023-07-17 PROCEDURE — 93000 ELECTROCARDIOGRAM COMPLETE: CPT | Performed by: INTERNAL MEDICINE

## 2023-07-17 NOTE — PROGRESS NOTES
[facility] CARDIOLOGY  [address] [facility] [address]  PHONE: [phone]        23      NAME:  Ben Gómez  : 1950  MRN: [id number]     Referring Cardiologist: Becky Dos Santos MD    Reason for Consultation: Dilated cardiomyopathy      ASSESSMENT and PLAN:  Diagnoses and all orders for this visit:      1. Dilated cardiomyopathy ([address]) s/p CRT-D, EF 40-45%      2. Chronic systolic heart failure ([address])      3. LBBB (left bundle branch block)      4. Essential hypertension     71year old female with a history of a dilated cardiomyopathy s/p CRT-D. Her device is at [facility] and she will require a changeout. -NICM - s/p CRT-D. History of LV lead issues and ICD malfunction s/p extraction and re-implantation at [address] by Tj Roman and . -Continue GDMT. -HTN - continue home therapy with losartan and coreg. Consider addition of dpCCB. -EP follow up in 1 year or PRN. Patient has been instructed and agrees to call our office with any issues or other concerns related to their cardiac condition(s) and/or complaint(s). No follow-up provider specified. Thank you for allowing me to participate in the electrophysiologic care of Ms. Ben Gómez. Please contact me if any questions or concerns were to arise. Waleska Bui. Calixto BRADSHAW, MS  Clinical Cardiac Electrophysiology  [facility]  23  9:47 AM    ===================================================================  Chief Complant:    Chief Complaint   Patient presents with    Follow-up    Cardiomyopathy      History:  Ben Gómez is a most pleasant 71 y.o. female with a past medical and cardiac history significant for NICM, EF 40-45%, LBBB s/p CRT-D. She does have PNS at times. Her device is at [facility] and she  will require a device changeout. She feels well otherwise. Her BIV pacing is in the 80% range. She comes in for follow up after a device ICD lead extraction 2/2 lead malfunction.  Her LV lead was

## 2023-07-24 NOTE — TELEPHONE ENCOUNTER
Requested Prescriptions     Pending Prescriptions Disp Refills    losartan (COZAAR) 25 MG tablet 90 tablet 3     Sig: Take 1 tablet by mouth daily   .

## 2023-07-24 NOTE — TELEPHONE ENCOUNTER
MEDICATION REFILL REQUEST      Name of Medication:  Losartan Potassium   Dose:  25 mg  Frequency:  daily   Quantity:    Days' supply:  90      Pharmacy Name/Location:  [facility]

## 2023-07-25 RX ORDER — LOSARTAN POTASSIUM 25 MG/1
25 TABLET ORAL DAILY
Qty: 90 TABLET | Refills: 3 | Status: SHIPPED | OUTPATIENT
Start: 2023-07-25

## 2023-08-01 DIAGNOSIS — Z95.810 AICD (AUTOMATIC CARDIOVERTER/DEFIBRILLATOR) PRESENT: ICD-10-CM

## 2023-08-01 DIAGNOSIS — I42.9 CARDIOMYOPATHY (HCC): ICD-10-CM

## 2023-08-01 DIAGNOSIS — I50.22 CHRONIC SYSTOLIC HEART FAILURE (HCC): ICD-10-CM

## 2023-11-27 ENCOUNTER — OFFICE VISIT (OUTPATIENT)
Age: 73
End: 2023-11-27
Payer: MEDICARE

## 2023-11-27 ENCOUNTER — NURSE ONLY (OUTPATIENT)
Age: 73
End: 2023-11-27

## 2023-11-27 VITALS
HEIGHT: 65 IN | DIASTOLIC BLOOD PRESSURE: 76 MMHG | WEIGHT: 184 LBS | SYSTOLIC BLOOD PRESSURE: 118 MMHG | BODY MASS INDEX: 30.66 KG/M2 | HEART RATE: 72 BPM

## 2023-11-27 DIAGNOSIS — I50.22 CHRONIC SYSTOLIC HEART FAILURE (HCC): Primary | ICD-10-CM

## 2023-11-27 DIAGNOSIS — I44.7 LBBB (LEFT BUNDLE BRANCH BLOCK): ICD-10-CM

## 2023-11-27 DIAGNOSIS — Z95.810 AICD (AUTOMATIC CARDIOVERTER/DEFIBRILLATOR) PRESENT: Primary | ICD-10-CM

## 2023-11-27 DIAGNOSIS — I42.9 CARDIOMYOPATHY (HCC): ICD-10-CM

## 2023-11-27 DIAGNOSIS — I42.9 CARDIOMYOPATHY, UNSPECIFIED TYPE (HCC): ICD-10-CM

## 2023-11-27 PROCEDURE — 3017F COLORECTAL CA SCREEN DOC REV: CPT | Performed by: INTERNAL MEDICINE

## 2023-11-27 PROCEDURE — G8484 FLU IMMUNIZE NO ADMIN: HCPCS | Performed by: INTERNAL MEDICINE

## 2023-11-27 PROCEDURE — 1036F TOBACCO NON-USER: CPT | Performed by: INTERNAL MEDICINE

## 2023-11-27 PROCEDURE — G8400 PT W/DXA NO RESULTS DOC: HCPCS | Performed by: INTERNAL MEDICINE

## 2023-11-27 PROCEDURE — 1090F PRES/ABSN URINE INCON ASSESS: CPT | Performed by: INTERNAL MEDICINE

## 2023-11-27 PROCEDURE — G8427 DOCREV CUR MEDS BY ELIG CLIN: HCPCS | Performed by: INTERNAL MEDICINE

## 2023-11-27 PROCEDURE — 99213 OFFICE O/P EST LOW 20 MIN: CPT | Performed by: INTERNAL MEDICINE

## 2023-11-27 PROCEDURE — 1123F ACP DISCUSS/DSCN MKR DOCD: CPT | Performed by: INTERNAL MEDICINE

## 2023-11-27 PROCEDURE — 3078F DIAST BP <80 MM HG: CPT | Performed by: INTERNAL MEDICINE

## 2023-11-27 PROCEDURE — 3074F SYST BP LT 130 MM HG: CPT | Performed by: INTERNAL MEDICINE

## 2023-11-27 PROCEDURE — G8417 CALC BMI ABV UP PARAM F/U: HCPCS | Performed by: INTERNAL MEDICINE

## 2023-11-27 RX ORDER — CARVEDILOL 6.25 MG/1
6.25 TABLET ORAL 2 TIMES DAILY WITH MEALS
Qty: 180 TABLET | Refills: 3 | Status: SHIPPED | OUTPATIENT
Start: 2023-11-27

## 2023-11-27 ASSESSMENT — ENCOUNTER SYMPTOMS
SHORTNESS OF BREATH: 0
HOARSE VOICE: 0
WHEEZING: 0
HEMATOCHEZIA: 0
DIARRHEA: 0
ORTHOPNEA: 0
BLURRED VISION: 0
COLOR CHANGE: 0
HEMATEMESIS: 0
ABDOMINAL PAIN: 0
BOWEL INCONTINENCE: 0
SPUTUM PRODUCTION: 0

## 2023-11-27 NOTE — PROGRESS NOTES
[facility] CARDIOLOGY  [address] [place] [address]  PHONE: [phone]        23        NAME:  Andrea Sarkar  : 1950  MRN: [id number]       SUBJECTIVE:   Andrea Sarkar is a 68 y.o. female seen for a follow up visit regarding the following: The patient has a NISCM with CRT-ICD. She returns for follow up. She reports feeling much better after ICD leads were exchanged and ICD was replaced in 2023. She reports no diaphragm stimulation since her ICD and leads were exchanged. She returns for ICD interrogation and follow up. Chief Complaint   Patient presents with    Cardiomyopathy       HPI:    Congestive Heart Failure  Presents for follow-up visit. Pertinent negatives include no abdominal pain, chest pain, chest pressure, claudication, edema, fatigue, muscle weakness, near-syncope, nocturia, orthopnea, palpitations, paroxysmal nocturnal dyspnea, shortness of breath or unexpected weight change. The symptoms have been stable. Past Medical History, Past Surgical History, Family history, Social History, and Medications were all reviewed with the patient today and updated as necessary. Current Outpatient Medications:     carvedilol (COREG) 6.25 MG tablet, Take 1 tablet by mouth 2 times daily (with meals), Disp: 180 tablet, Rfl: 3    losartan (COZAAR) 25 MG tablet, Take 1 tablet by mouth daily, Disp: 90 tablet, Rfl: 3    ZINC PO, Take by mouth, Disp: , Rfl:     clorazepate (TRANXENE) 3.75 MG tablet, Take 1 tablet by mouth as needed. , Disp: , Rfl:     fluticasone (FLONASE) 50 MCG/ACT nasal spray, 2 sprays by Nasal route once, Disp: , Rfl:     thyroid (ARMOUR) 60 MG tablet, Take 1 tablet by mouth daily, Disp: , Rfl:     zolpidem (AMBIEN) 10 MG tablet, Take 1 tablet by mouth nightly as needed. , Disp: , Rfl:   Allergies   Allergen Reactions    Penicillins Shortness Of Breath     Rash and shortness of breath    Codeine Nausea Only    Sulfa Antibiotics Other (See Comments)

## 2024-01-31 PROCEDURE — 93295 DEV INTERROG REMOTE 1/2/MLT: CPT | Performed by: INTERNAL MEDICINE

## 2024-01-31 PROCEDURE — 93296 REM INTERROG EVL PM/IDS: CPT | Performed by: INTERNAL MEDICINE

## 2024-05-13 ENCOUNTER — OFFICE VISIT (OUTPATIENT)
Age: 74
End: 2024-05-13
Payer: MEDICARE

## 2024-05-13 VITALS
HEIGHT: 65 IN | HEART RATE: 82 BPM | WEIGHT: 193.4 LBS | SYSTOLIC BLOOD PRESSURE: 132 MMHG | DIASTOLIC BLOOD PRESSURE: 88 MMHG | BODY MASS INDEX: 32.22 KG/M2

## 2024-05-13 DIAGNOSIS — Z95.810 AICD (AUTOMATIC CARDIOVERTER/DEFIBRILLATOR) PRESENT: ICD-10-CM

## 2024-05-13 DIAGNOSIS — I10 ESSENTIAL HYPERTENSION: Primary | ICD-10-CM

## 2024-05-13 DIAGNOSIS — I42.9 CARDIOMYOPATHY, UNSPECIFIED TYPE (HCC): ICD-10-CM

## 2024-05-13 PROCEDURE — G8400 PT W/DXA NO RESULTS DOC: HCPCS | Performed by: INTERNAL MEDICINE

## 2024-05-13 PROCEDURE — 3075F SYST BP GE 130 - 139MM HG: CPT | Performed by: INTERNAL MEDICINE

## 2024-05-13 PROCEDURE — G8427 DOCREV CUR MEDS BY ELIG CLIN: HCPCS | Performed by: INTERNAL MEDICINE

## 2024-05-13 PROCEDURE — 3079F DIAST BP 80-89 MM HG: CPT | Performed by: INTERNAL MEDICINE

## 2024-05-13 PROCEDURE — G8417 CALC BMI ABV UP PARAM F/U: HCPCS | Performed by: INTERNAL MEDICINE

## 2024-05-13 PROCEDURE — 1036F TOBACCO NON-USER: CPT | Performed by: INTERNAL MEDICINE

## 2024-05-13 PROCEDURE — 99214 OFFICE O/P EST MOD 30 MIN: CPT | Performed by: INTERNAL MEDICINE

## 2024-05-13 PROCEDURE — 1123F ACP DISCUSS/DSCN MKR DOCD: CPT | Performed by: INTERNAL MEDICINE

## 2024-05-13 PROCEDURE — 3017F COLORECTAL CA SCREEN DOC REV: CPT | Performed by: INTERNAL MEDICINE

## 2024-05-13 PROCEDURE — 1090F PRES/ABSN URINE INCON ASSESS: CPT | Performed by: INTERNAL MEDICINE

## 2024-05-13 RX ORDER — CARVEDILOL 6.25 MG/1
6.25 TABLET ORAL 2 TIMES DAILY WITH MEALS
Qty: 180 TABLET | Refills: 3 | Status: SHIPPED | OUTPATIENT
Start: 2024-05-13

## 2024-05-13 RX ORDER — LOSARTAN POTASSIUM 25 MG/1
25 TABLET ORAL DAILY
Qty: 90 TABLET | Refills: 3 | Status: SHIPPED | OUTPATIENT
Start: 2024-05-13

## 2024-05-13 ASSESSMENT — ENCOUNTER SYMPTOMS
COLOR CHANGE: 0
ABDOMINAL PAIN: 0
WHEEZING: 0
SPUTUM PRODUCTION: 0
BLURRED VISION: 0
BOWEL INCONTINENCE: 0
HEMATOCHEZIA: 0
ORTHOPNEA: 0
DIARRHEA: 0
SHORTNESS OF BREATH: 0
HOARSE VOICE: 0

## 2024-05-13 NOTE — PROGRESS NOTES
[facility] CARDIOLOGY  [address] [place] [address], [facility] [address]  [place] [address]  PHONE: [phone]        24        NAME:  Liat Juarez  : 1950  MRN: [id number]       SUBJECTIVE:   Liat Juarez is a 73 y.o. female seen for a follow up visit regarding the following: Nonischemic cardiomyopathy with CRT-ICD.She returns for scheduled follow up.Overall,she reports doing well.    Chief Complaint   Patient presents with    Cardiomyopathy       HPI:    Congestive Heart Failure  Presents for follow-up visit. Pertinent negatives include no abdominal pain, chest pain, chest pressure, claudication, edema, fatigue, muscle weakness, near-syncope, palpitations or shortness of breath. The symptoms have been stable. Compliance with total regimen is 0-25%. Compliance problems include adherence to diet.  Compliance with medications is %.       Past Medical History, Past Surgical History, Family history, Social History, and Medications were all reviewed with the patient today and updated as necessary.         Current Outpatient Medications:     carvedilol (COREG) 6.25 MG tablet, Take 1 tablet by mouth 2 times daily (with meals), Disp: 180 tablet, Rfl: 3    losartan (COZAAR) 25 MG tablet, Take 1 tablet by mouth daily, Disp: 90 tablet, Rfl: 3    ZINC PO, Take by mouth, Disp: , Rfl:     clorazepate (TRANXENE) 3.75 MG tablet, Take 1 tablet by mouth as needed., Disp: , Rfl:     fluticasone (FLONASE) 50 MCG/ACT nasal spray, 2 sprays by Nasal route once, Disp: , Rfl:     zolpidem (AMBIEN) 10 MG tablet, Take 1 tablet by mouth nightly as needed., Disp: , Rfl:     thyroid (ARMOUR) 60 MG tablet, Take 1 tablet by mouth daily (Patient not taking: Reported on 2024), Disp: , Rfl:   Allergies   Allergen Reactions    Penicillins Shortness Of Breath     Rash and shortness of breath    Codeine Nausea Only    Sulfa Antibiotics Other (See Comments)     Past Medical History:   Diagnosis Date    AICD (automatic

## 2024-07-08 DIAGNOSIS — I10 ESSENTIAL HYPERTENSION: ICD-10-CM

## 2024-07-08 DIAGNOSIS — I42.9 CARDIOMYOPATHY, UNSPECIFIED TYPE (HCC): ICD-10-CM

## 2024-07-08 NOTE — TELEPHONE ENCOUNTER
MEDICATION REFILL REQUEST      Name of Medication:  Losartan  Dose:  25 mg  Frequency:  QD  Quantity: 90   Days' supply:  90      Pharmacy Name/Location:  [facility]-[phone]

## 2024-07-09 RX ORDER — LOSARTAN POTASSIUM 25 MG/1
25 TABLET ORAL DAILY
Qty: 90 TABLET | Refills: 3 | Status: SHIPPED | OUTPATIENT
Start: 2024-07-09

## 2024-11-14 ENCOUNTER — NURSE ONLY (OUTPATIENT)
Age: 74
End: 2024-11-14

## 2024-11-14 ENCOUNTER — OFFICE VISIT (OUTPATIENT)
Age: 74
End: 2024-11-14

## 2024-11-14 VITALS
WEIGHT: 196 LBS | DIASTOLIC BLOOD PRESSURE: 82 MMHG | HEIGHT: 65 IN | BODY MASS INDEX: 32.65 KG/M2 | SYSTOLIC BLOOD PRESSURE: 142 MMHG | HEART RATE: 76 BPM

## 2024-11-14 DIAGNOSIS — I50.22 CHRONIC SYSTOLIC (CONGESTIVE) HEART FAILURE (HCC): ICD-10-CM

## 2024-11-14 DIAGNOSIS — I44.7 LBBB (LEFT BUNDLE BRANCH BLOCK): Primary | ICD-10-CM

## 2024-11-14 DIAGNOSIS — Z95.810 AICD (AUTOMATIC CARDIOVERTER/DEFIBRILLATOR) PRESENT: ICD-10-CM

## 2024-11-14 DIAGNOSIS — I42.9 CARDIOMYOPATHY, UNSPECIFIED TYPE (HCC): Primary | ICD-10-CM

## 2024-11-14 DIAGNOSIS — I10 ESSENTIAL HYPERTENSION: ICD-10-CM

## 2024-11-14 ASSESSMENT — ENCOUNTER SYMPTOMS
BOWEL INCONTINENCE: 0
SPUTUM PRODUCTION: 0
DIARRHEA: 0
SHORTNESS OF BREATH: 0
BLURRED VISION: 0
ABDOMINAL PAIN: 0
WHEEZING: 0
HEMATOCHEZIA: 0
HEMATEMESIS: 0
ORTHOPNEA: 0
COLOR CHANGE: 0
HOARSE VOICE: 0

## 2024-11-14 NOTE — PROGRESS NOTES
Gradient 3 mmHg    LVPWd 1.0 (A) 0.6 - 0.9 cm    LV E' Lateral Velocity 5.7 cm/s    LV E' Septal Velocity 7.4 cm/s    LV Ejection Fraction A2C 54 %    LV Ejection Fraction A4C 48 %    EF BP 49 (A) 55 - 100 %    LA Minor Axis 5.8 cm    LA Major Axis 5.8 cm    LA Area 2C 18.7 cm2    LA Area 4C 24.1 cm2    LA Volume MOD A2C 49 22 - 52 mL    LA Volume MOD A4C 79 (A) 22 - 52 mL    LA Volume BP 62 (A) 22 - 52 mL    LA Diameter 4.7 cm    AV Peak Velocity 1.4 m/s    AV Peak Gradient 7 mmHg    Aortic Root 3.3 cm    Ascending Aorta 3.2 cm    MV E Wave Deceleration Time 201.0 ms    MV A Velocity 1.27 m/s    MV E Velocity 1.09 m/s    RVIDd 2.7 cm    TR Max Velocity 1.94 m/s    TR Peak Gradient 15 mmHg    Fractional Shortening 2D 20 28 - 44 %    LV ESV Index A4C 33 mL/m2    LV EDV Index A4C 63 mL/m2    LV ESV Index A2C 22 mL/m2    LV EDV Index A2C 47 mL/m2    LVIDd Index 2.51 cm/m2    LVIDs Index 2.00 cm/m2    LV RWT Ratio 0.41     LV Mass 2D 176.0 (A) 67 - 162 g    LV Mass 2D Index 90.3 43 - 95 g/m2    MV E/A 0.86     E/E' Ratio (Averaged) 16.93     E/E' Lateral 19.12     E/E' Septal 14.73     LA Volume Index BP 32 16 - 34 ml/m2    LA Volume Index MOD A2C 25 16 - 34 ml/m2    LA Volume Index MOD A4C 41 (A) 16 - 34 ml/m2    LA Size Index 2.41 cm/m2    LA/AO Root Ratio 1.42     Ao Root Index 1.69 cm/m2    Ascending Aorta Index 1.64 cm/m2    AV Velocity Ratio 0.57     RV Basal Dimension 3.1 cm    RV Mid Dimension 2.0 cm    TAPSE 2.0 1.7 cm    Est. RA Pressure 3 mmHg    RVSP 18 mmHg     No results found for: \"CHOL\", \"CHOLPOCT\", \"CHLST\", \"CHOLV\", \"HDL\", \"HDLPOC\", \"HDLC\", \"LDL\", \"LDLC\", \"VLDLC\", \"VLDL\"  No results found for any visits on 11/14/24.    ASSESSMENT and PLAN    Liat was seen today for cardiomyopathy and hypertension.    Diagnoses and all orders for this visit:    Cardiomyopathy, unspecified type (HCC):Improved and stable.Continue Coreg and Losartan.    Essential hypertension:Mildly elevated.Monitor BP more closely at

## 2025-01-28 ENCOUNTER — OFFICE VISIT (OUTPATIENT)
Age: 75
End: 2025-01-28
Payer: MEDICARE

## 2025-01-28 VITALS
BODY MASS INDEX: 33.66 KG/M2 | SYSTOLIC BLOOD PRESSURE: 152 MMHG | DIASTOLIC BLOOD PRESSURE: 82 MMHG | HEART RATE: 74 BPM | WEIGHT: 202 LBS | HEIGHT: 65 IN

## 2025-01-28 DIAGNOSIS — I44.7 LBBB (LEFT BUNDLE BRANCH BLOCK): ICD-10-CM

## 2025-01-28 DIAGNOSIS — I42.0 DILATED CARDIOMYOPATHY (HCC): Primary | ICD-10-CM

## 2025-01-28 PROCEDURE — G8400 PT W/DXA NO RESULTS DOC: HCPCS | Performed by: INTERNAL MEDICINE

## 2025-01-28 PROCEDURE — 1090F PRES/ABSN URINE INCON ASSESS: CPT | Performed by: INTERNAL MEDICINE

## 2025-01-28 PROCEDURE — 3079F DIAST BP 80-89 MM HG: CPT | Performed by: INTERNAL MEDICINE

## 2025-01-28 PROCEDURE — 1036F TOBACCO NON-USER: CPT | Performed by: INTERNAL MEDICINE

## 2025-01-28 PROCEDURE — 3017F COLORECTAL CA SCREEN DOC REV: CPT | Performed by: INTERNAL MEDICINE

## 2025-01-28 PROCEDURE — G8417 CALC BMI ABV UP PARAM F/U: HCPCS | Performed by: INTERNAL MEDICINE

## 2025-01-28 PROCEDURE — 1123F ACP DISCUSS/DSCN MKR DOCD: CPT | Performed by: INTERNAL MEDICINE

## 2025-01-28 PROCEDURE — 1126F AMNT PAIN NOTED NONE PRSNT: CPT | Performed by: INTERNAL MEDICINE

## 2025-01-28 PROCEDURE — 93000 ELECTROCARDIOGRAM COMPLETE: CPT | Performed by: INTERNAL MEDICINE

## 2025-01-28 PROCEDURE — 1160F RVW MEDS BY RX/DR IN RCRD: CPT | Performed by: INTERNAL MEDICINE

## 2025-01-28 PROCEDURE — 99214 OFFICE O/P EST MOD 30 MIN: CPT | Performed by: INTERNAL MEDICINE

## 2025-01-28 PROCEDURE — 1159F MED LIST DOCD IN RCRD: CPT | Performed by: INTERNAL MEDICINE

## 2025-01-28 PROCEDURE — 3077F SYST BP >= 140 MM HG: CPT | Performed by: INTERNAL MEDICINE

## 2025-01-28 PROCEDURE — G8427 DOCREV CUR MEDS BY ELIG CLIN: HCPCS | Performed by: INTERNAL MEDICINE

## 2025-01-28 ASSESSMENT — ENCOUNTER SYMPTOMS
EYES NEGATIVE: 1
GASTROINTESTINAL NEGATIVE: 1
RESPIRATORY NEGATIVE: 1
ALLERGIC/IMMUNOLOGIC NEGATIVE: 1

## 2025-01-28 NOTE — PROGRESS NOTES
[facility] CARDIOLOGY  [address] [place] [address], [address]  [place] [address]  PHONE: [phone]        25      NAME:  Liat Juarez  : 1950  MRN: [id number]     Referring Cardiologist: Nain Thrasher MD    Reason for Consultation: Dilated cardiomyopathy      ASSESSMENT and PLAN:  Diagnoses and all orders for this visit:      1. Dilated cardiomyopathy (HCC) s/p CRT-D, EF 40-45%      2. Chronic systolic heart failure (HCC)      3. LBBB (left bundle branch block)      4. Essential hypertension     74 year old female with a history of a dilated cardiomyopathy s/p CRT-D. She had a RV lead malfunction and PNS from LV lead s/p extraction and reimplantation with Dr. Caldera at [facility].       -NICM - Stable, s/p CRT-D. History of LV lead issues and ICD malfunction s/p extraction and re-implantation at [facility] by Tj Caldera and . She has done very well since her procedure. BIV pacing at 97%. EF has improved to low normal range.      -HFrEF - stable, Continue GDMT.     -HTN - Stable, continue home therapy with losartan and coreg. Consider addition of dpCCB.     -EP follow up in 1 year or PRN.     Patient has been instructed and agrees to call our office with any issues or other concerns related to their cardiac condition(s) and/or complaint(s).    No follow-up provider specified.    Thank you for allowing me to participate in the electrophysiologic care of Ms. Liat Juarez. Please contact me if any questions or concerns were to arise.    Drake De Luna MD, MS  Clinical Cardiac Electrophysiology  [facility]  25  3:50 PM    ===================================================================  Chief Complant:    Chief Complaint   Patient presents with    Annual Exam    Cardiomyopathy    LBBB      History:  Liat Juarez is a most pleasant 74 y.o. female with a past medical and cardiac history significant for NICM, EF 40-45, now improved 50-55%, LBBB s/p CRT-D. She does have PNS at times.     She

## 2025-07-17 ENCOUNTER — OFFICE VISIT (OUTPATIENT)
Age: 75
End: 2025-07-17
Payer: MEDICARE

## 2025-07-17 VITALS
WEIGHT: 195.6 LBS | BODY MASS INDEX: 32.59 KG/M2 | HEIGHT: 65 IN | HEART RATE: 64 BPM | DIASTOLIC BLOOD PRESSURE: 80 MMHG | SYSTOLIC BLOOD PRESSURE: 130 MMHG

## 2025-07-17 DIAGNOSIS — I10 ESSENTIAL HYPERTENSION: ICD-10-CM

## 2025-07-17 DIAGNOSIS — Z95.810 AICD (AUTOMATIC CARDIOVERTER/DEFIBRILLATOR) PRESENT: Primary | ICD-10-CM

## 2025-07-17 DIAGNOSIS — I42.9 CARDIOMYOPATHY, UNSPECIFIED TYPE (HCC): ICD-10-CM

## 2025-07-17 PROCEDURE — G8400 PT W/DXA NO RESULTS DOC: HCPCS | Performed by: INTERNAL MEDICINE

## 2025-07-17 PROCEDURE — 1036F TOBACCO NON-USER: CPT | Performed by: INTERNAL MEDICINE

## 2025-07-17 PROCEDURE — 1159F MED LIST DOCD IN RCRD: CPT | Performed by: INTERNAL MEDICINE

## 2025-07-17 PROCEDURE — 1123F ACP DISCUSS/DSCN MKR DOCD: CPT | Performed by: INTERNAL MEDICINE

## 2025-07-17 PROCEDURE — 99214 OFFICE O/P EST MOD 30 MIN: CPT | Performed by: INTERNAL MEDICINE

## 2025-07-17 PROCEDURE — 1126F AMNT PAIN NOTED NONE PRSNT: CPT | Performed by: INTERNAL MEDICINE

## 2025-07-17 PROCEDURE — 1090F PRES/ABSN URINE INCON ASSESS: CPT | Performed by: INTERNAL MEDICINE

## 2025-07-17 PROCEDURE — 3079F DIAST BP 80-89 MM HG: CPT | Performed by: INTERNAL MEDICINE

## 2025-07-17 PROCEDURE — 3075F SYST BP GE 130 - 139MM HG: CPT | Performed by: INTERNAL MEDICINE

## 2025-07-17 PROCEDURE — 3017F COLORECTAL CA SCREEN DOC REV: CPT | Performed by: INTERNAL MEDICINE

## 2025-07-17 PROCEDURE — 1160F RVW MEDS BY RX/DR IN RCRD: CPT | Performed by: INTERNAL MEDICINE

## 2025-07-17 PROCEDURE — G8417 CALC BMI ABV UP PARAM F/U: HCPCS | Performed by: INTERNAL MEDICINE

## 2025-07-17 PROCEDURE — G8427 DOCREV CUR MEDS BY ELIG CLIN: HCPCS | Performed by: INTERNAL MEDICINE

## 2025-07-17 RX ORDER — LOSARTAN POTASSIUM 25 MG/1
25 TABLET ORAL DAILY
Qty: 90 TABLET | Refills: 3 | Status: SHIPPED | OUTPATIENT
Start: 2025-07-17

## 2025-07-17 RX ORDER — CARVEDILOL 6.25 MG/1
6.25 TABLET ORAL 2 TIMES DAILY WITH MEALS
Qty: 180 TABLET | Refills: 3 | Status: SHIPPED | OUTPATIENT
Start: 2025-07-17

## 2025-07-17 ASSESSMENT — ENCOUNTER SYMPTOMS
HEMATOCHEZIA: 0
SPUTUM PRODUCTION: 0
WHEEZING: 0
DIARRHEA: 0
BLURRED VISION: 0
BOWEL INCONTINENCE: 0
HOARSE VOICE: 0
ORTHOPNEA: 0
HEMATEMESIS: 0
SHORTNESS OF BREATH: 0
COLOR CHANGE: 0
ABDOMINAL PAIN: 0

## 2025-07-17 NOTE — PROGRESS NOTES
[facility] CARDIOLOGY  [address] [place] [address], [facility] [address]  [place] [address]  PHONE: [phone]        25        NAME:  Liat Juarez  : 1950  MRN: [id number]       SUBJECTIVE:   Liat Juarez is a 74 y.o. female seen for a follow up visit regarding the following: The patient has a history of nonischemic cardiomyopathy with CRT-D therapy and primary hypertension.  Most recent echo on 10/31/2024 reported left ventricular ejection fraction of 50 to 55% with mild mitral and tricuspid regurgitation.  The patient returns for scheduled follow-up.  Overall, she reports doing very well.    Chief Complaint   Patient presents with    Follow-up    Cardiomyopathy    Chest Pain    Hypertension       HPI:    Congestive Heart Failure  Presents for follow-up visit. Pertinent negatives include no abdominal pain, chest pain, chest pressure, claudication, edema, fatigue, muscle weakness, near-syncope, nocturia, orthopnea, palpitations, paroxysmal nocturnal dyspnea, shortness of breath or unexpected weight change. The symptoms have been stable.       Past Medical History, Past Surgical History, Family history, Social History, and Medications were all reviewed with the patient today and updated as necessary.         Current Outpatient Medications:     losartan (COZAAR) 25 MG tablet, Take 1 tablet by mouth daily, Disp: 90 tablet, Rfl: 3    carvedilol (COREG) 6.25 MG tablet, Take 1 tablet by mouth 2 times daily (with meals), Disp: 180 tablet, Rfl: 3    KORINA BERRY PO, Take by mouth, Disp: , Rfl:     Magnesium 100 MG TABS, Take by mouth, Disp: , Rfl:     ZINC PO, Take by mouth, Disp: , Rfl:     clorazepate (TRANXENE) 3.75 MG tablet, Take 1 tablet by mouth as needed., Disp: , Rfl:   Allergies   Allergen Reactions    Penicillins Shortness Of Breath     Rash and shortness of breath    Codeine Nausea Only    Sulfa Antibiotics Other (See Comments)     Past Medical History:   Diagnosis Date    AICD (automatic

## (undated) DEVICE — DERMABOND SKIN ADH 0.7ML -- DERMABOND ADVANCED 12/BX

## (undated) DEVICE — PLASMABLADE X PS210-030S-LIGHT 3.0SL: Brand: PLASMABLADE™ X

## (undated) DEVICE — INTRODUCER SPLIT SHEATH PRELUDE SNAP 8FR X 13CM

## (undated) DEVICE — SUTURE ETHBND EXCEL SZ 0 L18IN NONABSORBABLE GRN L26MM MO-6 CX45D

## (undated) DEVICE — SUTURE STRATAFIX SPRL SZ 3-0 L9IN ABSRB VLT FS L26MM 3/8 SXPD2B419

## (undated) DEVICE — DRESSING POSTOP AG PRISMASEAL 3.5X6IN

## (undated) DEVICE — SUTURE ABSORBABLE MONOFILAMENT 4-0 CV15 6 IN PUR V-LOC 90 VLOCM1203

## (undated) DEVICE — Device

## (undated) DEVICE — Z DUPLICATE USE 2275497 DRSG POSTOP PRMSL AG 3.5X6IN

## (undated) DEVICE — 18G NG KIT WITH 96IN PROBE COVER (10 PK): Brand: SITE-RITE

## (undated) DEVICE — ADHESIVE SKIN CLSR 0.7ML TOP DERMBND ADV